# Patient Record
Sex: MALE
[De-identification: names, ages, dates, MRNs, and addresses within clinical notes are randomized per-mention and may not be internally consistent; named-entity substitution may affect disease eponyms.]

---
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
## 2024-05-29 ENCOUNTER — APPOINTMENT (EMERGENCY)
Dept: RADIOLOGY | Facility: HOSPITAL | Age: 24
End: 2024-05-29
Payer: COMMERCIAL

## 2024-05-29 ENCOUNTER — HOSPITAL ENCOUNTER (EMERGENCY)
Facility: HOSPITAL | Age: 24
Discharge: HOME | End: 2024-05-29
Attending: EMERGENCY MEDICINE
Payer: COMMERCIAL

## 2024-05-29 VITALS
HEART RATE: 65 BPM | SYSTOLIC BLOOD PRESSURE: 124 MMHG | HEIGHT: 72 IN | OXYGEN SATURATION: 100 % | RESPIRATION RATE: 19 BRPM | DIASTOLIC BLOOD PRESSURE: 74 MMHG | TEMPERATURE: 98.2 F | WEIGHT: 185 LBS | BODY MASS INDEX: 25.06 KG/M2

## 2024-05-29 DIAGNOSIS — M25.571 ACUTE RIGHT ANKLE PAIN: Primary | ICD-10-CM

## 2024-05-29 PROCEDURE — 2W3QX1Z IMMOBILIZATION OF RIGHT LOWER LEG USING SPLINT: ICD-10-PCS | Performed by: EMERGENCY MEDICINE

## 2024-05-29 PROCEDURE — 99283 EMERGENCY DEPT VISIT LOW MDM: CPT | Mod: 25

## 2024-05-29 PROCEDURE — 29515 APPLICATION SHORT LEG SPLINT: CPT | Mod: RT

## 2024-05-29 PROCEDURE — 73610 X-RAY EXAM OF ANKLE: CPT | Mod: RT

## 2024-05-29 PROCEDURE — 73630 X-RAY EXAM OF FOOT: CPT | Mod: RT

## 2024-05-30 NOTE — ED ATTESTATION NOTE
The patient was evaluated and managed by the physician assistant / nurse practitioner.  During the patient visit, I was present in the department and was available for consultation/patient evaluation.         Brady Amaya MD  05/30/24 6299

## 2024-05-30 NOTE — ED PROVIDER NOTES
Emergency Medicine Note  HPI   HISTORY OF PRESENT ILLNESS     23-year-old male present with concern for right ankle injury.  Playing  basketball, everted his ankle, developed pain on the top proximal aspect of his foot.  Initially to bear weight, however increased pain and swelling is made weightbearing difficult on this foot.  Has not taken any medication for pain prior to arrival.  Endorses breaking a bone in this part of his foot in 2015, managed nonoperatively with immobilization.  No other injury or pain of R LE endorsed.  No other acute complaints            Patient History   PAST HISTORY     Reviewed from Nursing Triage:       No past medical history on file.    No past surgical history on file.    No family history on file.           Review of Systems   REVIEW OF SYSTEMS     Review of Systems      VITALS     ED Vitals      Date/Time Temp Pulse Resp BP SpO2 [facility]   05/29/24 2038 36.8 °C (98.2 °F) 65 19 124/74 100 % DJM          Pulse Ox %: 99 % (05/29/24 2202)  Pulse Ox Interpretation: Normal (05/29/24 2202)           Physical Exam   PHYSICAL EXAM     Physical Exam  Vitals and nursing note reviewed.   Constitutional:       Appearance: He is well-developed.   HENT:      Head: Normocephalic and atraumatic.   Eyes:      Conjunctiva/sclera: Conjunctivae normal.   Cardiovascular:      Rate and Rhythm: Normal rate and regular rhythm.   Pulmonary:      Effort: Pulmonary effort is normal.      Breath sounds: Normal breath sounds.   Abdominal:      General: There is no distension.      Palpations: Abdomen is soft. There is no mass.      Tenderness: There is no abdominal tenderness.   Musculoskeletal:         General: No tenderness or deformity. Normal range of motion.      Cervical back: Normal range of motion.      Comments: Edema proximal dorsal aspect of right foot, moderately TTP.  Full ROM of ankle without restriction.  N/V intact distally   Skin:     General: Skin is warm and dry.   Neurological:      Mental  Status: He is alert. Mental status is at baseline.   Psychiatric:         Behavior: Behavior normal.           PROCEDURES     Procedures     DATA     Results       None            Imaging Results              X-RAY FOOT RIGHT 3+ VIEWS (Preliminary result)  Result time 05/29/24 22:02:11      Preliminary Interpretation    No acute pathology on my contemporaneous review.                                       X-RAY ANKLE RIGHT 3+ VIEWS (Preliminary result)  Result time 05/29/24 22:02:07      Preliminary Interpretation    No acute pathology on my contemporaneous review.                                      No orders to display       Scoring tools                                  ED Course & MDM   MDM / ED COURSE / CLINICAL IMPRESSION / DISPO     Medical Decision Making  23-year-old male present with concern right ankle injury  Afebrile in triage, VS NL for age  Physical exam as above  X-rays obtained from triage reviewed, no obvious fracture.  Patient updated.  Advised radiology will over read images in the morning patient will receive call if there is any addendum to this interpretation  Suspect ankle sprain, education provided, will provide crutches and air splint  D/c with pcp/ortho f/u, return precautions    Amount and/or Complexity of Data Reviewed  Radiology: ordered and independent interpretation performed.             Clinical Impression      None                 Negrito Rosario PA C  05/30/24 0004

## 2024-05-30 NOTE — DISCHARGE INSTRUCTIONS
1) You were seen here following an injury. Our imaging showed no acute fracture or other problem. Our radiologists will review your imaging in the morning--you will receive a call if there are any changes noted.  Follow up with your PCP (primary care physician) within 1-3 days.   2) Continue taking your prescribed medications as directed. Take 1000mg Tylenol and 600mg Ibuprofen (Motrin), alternating every six hours, as needed for pain. Try and take Ibuprofen with food to prevent irritating your stomach   3) Use RICE therapy (Rest, Ice, Compression, Elevation) for additional pain control   4) Return to the Emergency Department if your symptoms worsen, or if you experience any: fevers, sudden severe headaches, chest pain, shortness of breath, increased pain/swelling/redness in affected area, numbness/tingling/loss of strength or sensation in your extremities, sudden severe dizziness, or passing out